# Patient Record
Sex: MALE
[De-identification: names, ages, dates, MRNs, and addresses within clinical notes are randomized per-mention and may not be internally consistent; named-entity substitution may affect disease eponyms.]

---

## 2018-09-09 ENCOUNTER — TRANSCRIPTION ENCOUNTER (OUTPATIENT)
Age: 58
End: 2018-09-09

## 2022-05-18 PROBLEM — Z00.00 ENCOUNTER FOR PREVENTIVE HEALTH EXAMINATION: Status: ACTIVE | Noted: 2022-05-18

## 2022-05-25 ENCOUNTER — OUTPATIENT (OUTPATIENT)
Dept: OUTPATIENT SERVICES | Facility: HOSPITAL | Age: 62
LOS: 1 days | End: 2022-05-25
Payer: COMMERCIAL

## 2022-05-25 ENCOUNTER — APPOINTMENT (OUTPATIENT)
Dept: SLEEP CENTER | Facility: HOME HEALTH | Age: 62
End: 2022-05-25
Payer: COMMERCIAL

## 2022-05-25 PROCEDURE — 95800 SLP STDY UNATTENDED: CPT

## 2022-05-25 PROCEDURE — 95800 SLP STDY UNATTENDED: CPT | Mod: 26

## 2022-05-26 DIAGNOSIS — G47.33 OBSTRUCTIVE SLEEP APNEA (ADULT) (PEDIATRIC): ICD-10-CM

## 2023-02-13 ENCOUNTER — APPOINTMENT (OUTPATIENT)
Dept: PULMONOLOGY | Facility: CLINIC | Age: 63
End: 2023-02-13
Payer: COMMERCIAL

## 2023-02-13 VITALS
DIASTOLIC BLOOD PRESSURE: 85 MMHG | HEART RATE: 64 BPM | HEIGHT: 68 IN | WEIGHT: 161 LBS | SYSTOLIC BLOOD PRESSURE: 152 MMHG | OXYGEN SATURATION: 99 % | BODY MASS INDEX: 24.4 KG/M2 | TEMPERATURE: 97.7 F

## 2023-02-13 DIAGNOSIS — Z87.438 PERSONAL HISTORY OF OTHER DISEASES OF MALE GENITAL ORGANS: ICD-10-CM

## 2023-02-13 DIAGNOSIS — Z85.831 PERSONAL HISTORY OF MALIGNANT NEOPLASM OF SOFT TISSUE: ICD-10-CM

## 2023-02-13 DIAGNOSIS — Z83.438 FAMILY HISTORY OF OTHER DISORDER OF LIPOPROTEIN METABOLISM AND OTHER LIPIDEMIA: ICD-10-CM

## 2023-02-13 DIAGNOSIS — Z82.49 FAMILY HISTORY OF ISCHEMIC HEART DISEASE AND OTHER DISEASES OF THE CIRCULATORY SYSTEM: ICD-10-CM

## 2023-02-13 PROCEDURE — 99203 OFFICE O/P NEW LOW 30 MIN: CPT

## 2023-02-13 RX ORDER — TADALAFIL 5 MG/1
5 TABLET ORAL
Refills: 0 | Status: ACTIVE | COMMUNITY

## 2023-02-13 NOTE — PHYSICAL EXAM
[General Appearance - Well Developed] : well developed [Normal Appearance] : normal appearance [Well Groomed] : well groomed [General Appearance - Well Nourished] : well nourished [No Deformities] : no deformities [General Appearance - In No Acute Distress] : no acute distress [Normal Conjunctiva] : the conjunctiva exhibited no abnormalities [Neck Appearance] : the appearance of the neck was normal [Apical Impulse] : the apical impulse was normal [Heart Rate And Rhythm] : heart rate was normal and rhythm regular [] : no respiratory distress [Respiration, Rhythm And Depth] : normal respiratory rhythm and effort [Exaggerated Use Of Accessory Muscles For Inspiration] : no accessory muscle use [Auscultation Breath Sounds / Voice Sounds] : lungs were clear to auscultation bilaterally [Abnormal Walk] : normal gait [Skin Color & Pigmentation] : normal skin color and pigmentation [Oriented To Time, Place, And Person] : oriented to person, place, and time [Impaired Insight] : insight and judgment were intact

## 2023-02-13 NOTE — CONSULT LETTER
[Dear  ___] : Dear  [unfilled], [Consult Letter:] : I had the pleasure of evaluating your patient, [unfilled]. [Please see my note below.] : Please see my note below. [Consult Closing:] : Thank you very much for allowing me to participate in the care of this patient.  If you have any questions, please do not hesitate to contact me. [Sincerely,] : Sincerely, [FreeTextEntry3] : Yocasta Sim MD\par \par [place] [facility] [employer] at [facility]\par Pulmonary, Critical Care, and Sleep Medicine\par

## 2023-02-13 NOTE — ASSESSMENT
[FreeTextEntry1] : REVIEWED\par HST AHI 12.6 3%; T88 )\par \par 63yo man with mild TALIB and insomnia. Despite his TALIB being mild it can still lead to insomnia, especially maintenance insomnia. We have decided to repeat testing at this time; referred to the [facility] for an HST. Also advised him on sleep hygiene recommendations including  not spending more than 30 minutes in bed when not sleep, keeping a consistent bedtime and wakeup time as well as having realistic expectations around sleep. Follow up after HST and within 4 weeks.

## 2023-02-13 NOTE — REVIEW OF SYSTEMS
[Difficulty Initiating Sleep] : no difficulty falling asleep [Difficulty Maintaining Sleep] : difficulty maintaining sleep [Lower Extremity Discomfort] : no lower extremity discomfort [Late day/ Evening symptoms] : no late day/evening symptoms [Negative] : Musculoskeletal

## 2023-02-13 NOTE — HISTORY OF PRESENT ILLNESS
[FreeTextEntry1] : 61yo with lifelong intermittent insomnia presenting to [facility] care. Currently in a bout of insomnia, lasting for about 2 weeks. Has no issue falling asleep but cannot stay asleep. TST is about 6 hours but needs 8+. Wakes up between 3 and 5AM and usually cannot get back to sleep after. Takes an hour nap each day in the early PM. Had a recent HST. Does not know if he snores.  [ESS] : 5

## 2023-03-06 ENCOUNTER — OUTPATIENT (OUTPATIENT)
Dept: OUTPATIENT SERVICES | Facility: HOSPITAL | Age: 63
LOS: 1 days | End: 2023-03-06
Payer: COMMERCIAL

## 2023-03-06 ENCOUNTER — APPOINTMENT (OUTPATIENT)
Dept: SLEEP CENTER | Facility: HOME HEALTH | Age: 63
End: 2023-03-06
Payer: COMMERCIAL

## 2023-03-06 PROCEDURE — 95800 SLP STDY UNATTENDED: CPT

## 2023-03-06 PROCEDURE — 95800 SLP STDY UNATTENDED: CPT | Mod: 26

## 2023-03-07 ENCOUNTER — TRANSCRIPTION ENCOUNTER (OUTPATIENT)
Age: 63
End: 2023-03-07

## 2023-03-07 DIAGNOSIS — G47.33 OBSTRUCTIVE SLEEP APNEA (ADULT) (PEDIATRIC): ICD-10-CM

## 2023-03-28 ENCOUNTER — APPOINTMENT (OUTPATIENT)
Dept: PULMONOLOGY | Facility: CLINIC | Age: 63
End: 2023-03-28
Payer: COMMERCIAL

## 2023-03-28 VITALS
WEIGHT: 161.8 LBS | BODY MASS INDEX: 24.52 KG/M2 | HEIGHT: 68 IN | TEMPERATURE: 97.9 F | OXYGEN SATURATION: 100 % | DIASTOLIC BLOOD PRESSURE: 91 MMHG | SYSTOLIC BLOOD PRESSURE: 144 MMHG | HEART RATE: 55 BPM

## 2023-03-28 PROCEDURE — 99213 OFFICE O/P EST LOW 20 MIN: CPT

## 2023-03-28 NOTE — CONSULT LETTER
[Dear  ___] : Dear  [unfilled], [Courtesy Letter:] : I had the pleasure of seeing your patient, [unfilled], in my office today. [Please see my note below.] : Please see my note below. [Consult Closing:] : Thank you very much for allowing me to participate in the care of this patient.  If you have any questions, please do not hesitate to contact me. [Sincerely,] : Sincerely, [FreeTextEntry3] : Yocasta Sim MD\par \par [place] [facility] [employer] at [facility]\par Pulmonary, Critical Care, and Sleep Medicine\par

## 2023-03-28 NOTE — ASSESSMENT
[FreeTextEntry1] : REVIEWED\par HST 2022 AHI 12.6 3%; T88 \par HST 2023: AHI 3.2 4%; AHI 12.8 3%: T88 0\par \par 63yo man with mild TALIB and insomnia. Despite his TALIB being mild it can still lead to insomnia, especially maintenance insomnia. Repeat HST c/w mild TALIB. Sleep hygiene recs have been working since implementation; should continue mary grace not using caffeinated products and avoiding naps. It will take at least 3 months to recover from sleep deprivation fully so do not expect to feel fully refreshed until then. If he is still having fatigue and unrefreshed sleep may need to consider treatment for mild TALIB treatment. Follow up in 3 months.  No significant procedures or tests performed during this admission,

## 2023-03-28 NOTE — HISTORY OF PRESENT ILLNESS
[FreeTextEntry1] : 63yo with lifelong intermittent insomnia presenting to [facility] care. Currently in a bout of insomnia, lasting for about 2 weeks. Has no issue falling asleep but cannot stay asleep. TST is about 6 hours but needs 8+. Wakes up between 3 and 5AM and usually cannot get back to sleep after. Takes an hour nap each day in the early PM. Had a recent HST. Does not know if he snores. \par \par 3/28/2023\par Has been following sleep hygiene recs and has been able to sleep through the night for approx 8 hours. Abstains from naps and caffeeine. Had HST. [ESS] : 5

## 2023-08-10 ENCOUNTER — APPOINTMENT (OUTPATIENT)
Dept: PULMONOLOGY | Facility: CLINIC | Age: 63
End: 2023-08-10
Payer: COMMERCIAL

## 2023-08-10 VITALS
WEIGHT: 160 LBS | HEART RATE: 62 BPM | BODY MASS INDEX: 24.25 KG/M2 | HEIGHT: 68 IN | OXYGEN SATURATION: 99 % | DIASTOLIC BLOOD PRESSURE: 81 MMHG | SYSTOLIC BLOOD PRESSURE: 129 MMHG | TEMPERATURE: 97.4 F

## 2023-08-10 PROCEDURE — 99214 OFFICE O/P EST MOD 30 MIN: CPT

## 2023-08-11 NOTE — PHYSICAL EXAM
[General Appearance - Well Developed] : well developed [General Appearance - Well Nourished] : well nourished [Normal Oropharynx] : normal oropharynx [Neck Appearance] : the appearance of the neck was normal [Heart Rate And Rhythm] : heart rate was normal and rhythm regular [Heart Sounds] : normal S1 and S2 [] : no respiratory distress [Nail Clubbing] : no clubbing of the fingernails [Oriented To Time, Place, And Person] : oriented to person, place, and time [Affect] : the affect was normal

## 2023-08-11 NOTE — ASSESSMENT
[FreeTextEntry1] : REVIEWED HST 2022 AHI 12.6 3%; T88 HST 2023: AHI 3.2 4%; AHI 12.8 3%: T88 0  63yo man with mild TALIB and insomnia. Despite his TALIB being mild it can still lead to insomnia, especially maintenance insomnia. Repeat HST c/w mild TALIB. Has had a slight regression of his insomnia recently as he's been less compliant with the sleep hygiene techniques. Previously had great success with sleep hygiene recs, mary grace not using caffeinated products, avoiding naps, maintaining consistent sleep schedule, sleeping in cool temperature, avoiding screens in bed. Reviewed that it takes at least 3 months to recover from sleep deprivation. If he is still having fatigue and unrefreshed sleep may need to consider treatment for mild TALIB treatment, deferring for now.  Follow-up in 6 months

## 2023-08-11 NOTE — CONSULT LETTER
[Dear  ___] : Dear  [unfilled], [Courtesy Letter:] : I had the pleasure of seeing your patient, [unfilled], in my office today. [Please see my note below.] : Please see my note below. [Consult Closing:] : Thank you very much for allowing me to participate in the care of this patient.  If you have any questions, please do not hesitate to contact me. [Sincerely,] : Sincerely, [FreeTextEntry3] : Yocasta Sim MD  [employer] [facility] [employer] at [facility] Pulmonary, Critical Care, and Sleep Medicine

## 2023-08-11 NOTE — REVIEW OF SYSTEMS
[Difficulty Initiating Sleep] : difficulty falling asleep [Negative] : Psychiatric [Difficulty Maintaining Sleep] : no difficulty maintaining sleep [Chronic Insomnia] : chronic  insomnia [Lower Extremity Discomfort] : no lower extremity discomfort [Late day/ Evening symptoms] : no late day/evening symptoms [Hypersomnolence] : not sleeping much more than usual

## 2023-08-11 NOTE — HISTORY OF PRESENT ILLNESS
[FreeTextEntry1] : 63yo with lifelong intermittent insomnia presenting to [facility]. Currently in a bout of insomnia, lasting for about 2 weeks. Has no issue falling asleep but cannot stay asleep. TST is about 6 hours but needs 8+. Wakes up between 3 and 5AM and usually cannot get back to sleep after. Takes an hour nap each day in the early PM. Had a recent HST. Does not know if he snores.  3/28/2023 Has been following sleep hygiene recs and has been able to sleep through the night for approx 8 hours. Abstains from naps and caffeeine. Had HST.  08/10/2023 Since last visit, some worsening of his sleep maintenance insomnia. Goes to bed around 10:30pm-11pm. Notes that his natural inclination, though, is to go to bed about 9-10pm.  Has been less diligent in recent months with sleep hygiene techniques, although notices that when he is compliant feels great improvement in sleep quality.  [ESS] : 5

## 2024-02-20 ENCOUNTER — APPOINTMENT (OUTPATIENT)
Dept: PULMONOLOGY | Facility: CLINIC | Age: 64
End: 2024-02-20
Payer: MEDICARE

## 2024-02-20 VITALS
HEIGHT: 78 IN | TEMPERATURE: 97 F | OXYGEN SATURATION: 99 % | SYSTOLIC BLOOD PRESSURE: 132 MMHG | DIASTOLIC BLOOD PRESSURE: 84 MMHG | HEART RATE: 58 BPM

## 2024-02-20 DIAGNOSIS — F51.04 PSYCHOPHYSIOLOGIC INSOMNIA: ICD-10-CM

## 2024-02-20 DIAGNOSIS — G47.33 OBSTRUCTIVE SLEEP APNEA (ADULT) (PEDIATRIC): ICD-10-CM

## 2024-02-20 DIAGNOSIS — R06.83 SNORING: ICD-10-CM

## 2024-02-20 PROCEDURE — 99214 OFFICE O/P EST MOD 30 MIN: CPT

## 2024-02-20 NOTE — HISTORY OF PRESENT ILLNESS
[FreeTextEntry1] : 63yo with lifelong intermittent insomnia presenting to [facility]. Currently in a bout of insomnia, lasting for about 2 weeks. Has no issue falling asleep but cannot stay asleep. TST is about 6 hours but needs 8+. Wakes up between 3 and 5AM and usually cannot get back to sleep after. Takes an hour nap each day in the early PM. Had a recent HST. Does not know if he snores.  3/28/2023 Has been following sleep hygiene recs and has been able to sleep through the night for approx 8 hours. Abstains from naps and caffeine. Had HST.  08/10/2023 Since last visit, some worsening of his sleep maintenance insomnia. Goes to bed around 10:30pm-11pm. Notes that his natural inclination, though, is to go to bed about 9-10pm. Has been less diligent in recent months with sleep hygiene techniques, although notices that when he is compliant feels great improvement in sleep quality.  2/20/2024 Notices poor sleep mainly associated with work stress (bad in January). Has been improving in past few weeks as he has been employing more sleep hygiene techniques. Trying to avoid caffeine, exercising more. Goes to bed usually around 10:30PM (tries to keep normal sleep time). Feels best with 8-9 hours of sleep. When he has trouble sleeping often works. Using [employer] ring. No weight gain or snoring. Endorses Bruxism.   [ESS] : 5

## 2024-02-20 NOTE — PHYSICAL EXAM
[General Appearance - Well Developed] : well developed [General Appearance - Well Nourished] : well nourished [] : no respiratory distress [Exaggerated Use Of Accessory Muscles For Inspiration] : no accessory muscle use [Skin Color & Pigmentation] : normal skin color and pigmentation [Oriented To Time, Place, And Person] : oriented to person, place, and time [Affect] : the affect was normal [Neck Appearance] : the appearance of the neck was normal [Abnormal Walk] : normal gait

## 2024-02-20 NOTE — ASSESSMENT
[FreeTextEntry1] : REVIEWED HST 2022 AHI 12.6 3%; T88 HST 2023: AHI 3.2 4%; AHI 12.8 3%: T88 0  63yo man with mild TALIB and insomnia. Despite his TALIB being mild it can still lead to insomnia, especially maintenance insomnia. Repeat HST c/w mild TALIB. Some improvement in insomnia recently as he's been more compliant with the sleep hygiene techniques and exercising. Previously had great success with sleep hygiene recs, mary grace not using caffeinated products, maintaining consistent sleep schedule, sleeping in cool temperature, avoiding screens in bed.  Although he has success with sleep hygiene, discussed treating his TALIB could still help with insomnia. Also has bruxism and may get mouthguard. Discussed CPAP and MAD; he will let us know which treatment option he would like to pursue.   Follow-up in 6 months, sooner if he decides to treat mild TALIB.

## 2024-02-20 NOTE — END OF VISIT
[FreeTextEntry3] :   I, Yocasta Sim MD, personally performed the evaluation and management (E/M) services for this patient. That E/M includes conducting the clinically appropriate initial history &/or exam, assessing all conditions, and establishing the plan of care. I have also independently reviewed the medical records and imaging at the time of this office visit, and discussed the above mentioned images with interpretations with the patient. Today, TERRA Gonzalez was here to participate in the visit & follow plan of care established by me.

## 2024-02-20 NOTE — REVIEW OF SYSTEMS
[Chronic Insomnia] : chronic  insomnia [Negative] : Psychiatric [Difficulty Initiating Sleep] : no difficulty falling asleep [Difficulty Maintaining Sleep] : no difficulty maintaining sleep [Lower Extremity Discomfort] : no lower extremity discomfort [Late day/ Evening symptoms] : no late day/evening symptoms [Unusual Sleep Behavior] : no unusual sleep behavior [Hypersomnolence] : not sleeping much more than usual [Cataplexy] :  no cataplexy

## 2024-02-20 NOTE — CONSULT LETTER
[Dear  ___] : Dear  [unfilled], [Courtesy Letter:] : I had the pleasure of seeing your patient, [unfilled], in my office today. [Please see my note below.] : Please see my note below. [Consult Closing:] : Thank you very much for allowing me to participate in the care of this patient.  If you have any questions, please do not hesitate to contact me. [Sincerely,] : Sincerely, [FreeTextEntry3] : Yocasta Sim MD  [employer] at [facility] Pulmonary, Critical Care, and Sleep Medicine